# Patient Record
Sex: FEMALE | Race: AMERICAN INDIAN OR ALASKA NATIVE | NOT HISPANIC OR LATINO | Employment: OTHER | URBAN - METROPOLITAN AREA
[De-identification: names, ages, dates, MRNs, and addresses within clinical notes are randomized per-mention and may not be internally consistent; named-entity substitution may affect disease eponyms.]

---

## 2017-04-03 ENCOUNTER — TELEPHONE (OUTPATIENT)
Dept: RADIOLOGY | Facility: MEDICAL CENTER | Age: 72
End: 2017-04-03

## 2017-04-03 NOTE — TELEPHONE ENCOUNTER
ELFEGO DID NOT FIND ANY CURRENT PROBLEMS (EX; LUMP) WITH PT; PT REQUESTED DIAGNOSTIC MAMMOGRAM W/US; ADVISED I WILL CALL PT TO DISCUSS IF SHE WANTS DIAG OR SCREENING W/SONOCINE/TML

## 2017-05-15 ENCOUNTER — HOSPITAL ENCOUNTER (OUTPATIENT)
Dept: RADIOLOGY | Facility: MEDICAL CENTER | Age: 72
End: 2017-05-15
Attending: FAMILY MEDICINE
Payer: MEDICARE

## 2017-05-15 DIAGNOSIS — Z12.31 VISIT FOR SCREENING MAMMOGRAM: ICD-10-CM

## 2017-05-15 PROCEDURE — 77063 BREAST TOMOSYNTHESIS BI: CPT

## 2017-07-21 ENCOUNTER — HOSPITAL ENCOUNTER (OUTPATIENT)
Dept: RADIOLOGY | Facility: MEDICAL CENTER | Age: 72
End: 2017-07-21
Attending: PHYSICIAN ASSISTANT
Payer: MEDICARE

## 2017-07-21 DIAGNOSIS — M25.552 LEFT HIP PAIN: ICD-10-CM

## 2017-07-21 DIAGNOSIS — M96.1 POSTLAMINECTOMY SYNDROME, UNSPECIFIED REGION: ICD-10-CM

## 2017-07-21 PROCEDURE — 72110 X-RAY EXAM L-2 SPINE 4/>VWS: CPT

## 2017-07-21 PROCEDURE — 73521 X-RAY EXAM HIPS BI 2 VIEWS: CPT

## 2018-05-23 ENCOUNTER — HOSPITAL ENCOUNTER (OUTPATIENT)
Dept: RADIOLOGY | Facility: MEDICAL CENTER | Age: 73
End: 2018-05-23
Attending: NURSE PRACTITIONER
Payer: MEDICARE

## 2018-05-23 DIAGNOSIS — Z12.31 VISIT FOR SCREENING MAMMOGRAM: ICD-10-CM

## 2018-05-23 PROCEDURE — 77063 BREAST TOMOSYNTHESIS BI: CPT

## 2019-06-14 ENCOUNTER — HOSPITAL ENCOUNTER (OUTPATIENT)
Dept: RADIOLOGY | Facility: MEDICAL CENTER | Age: 74
End: 2019-06-14
Attending: FAMILY MEDICINE
Payer: MEDICARE

## 2019-06-14 DIAGNOSIS — Z12.31 VISIT FOR SCREENING MAMMOGRAM: ICD-10-CM

## 2019-06-14 PROCEDURE — 77063 BREAST TOMOSYNTHESIS BI: CPT

## 2019-06-27 ENCOUNTER — HOSPITAL ENCOUNTER (OUTPATIENT)
Facility: MEDICAL CENTER | Age: 74
End: 2019-06-27
Attending: FAMILY MEDICINE
Payer: MEDICARE

## 2019-06-27 ENCOUNTER — OFFICE VISIT (OUTPATIENT)
Dept: URGENT CARE | Facility: PHYSICIAN GROUP | Age: 74
End: 2019-06-27
Payer: MEDICARE

## 2019-06-27 VITALS
TEMPERATURE: 97.8 F | RESPIRATION RATE: 16 BRPM | BODY MASS INDEX: 31.8 KG/M2 | HEIGHT: 60 IN | OXYGEN SATURATION: 93 % | WEIGHT: 162 LBS | DIASTOLIC BLOOD PRESSURE: 78 MMHG | HEART RATE: 92 BPM | SYSTOLIC BLOOD PRESSURE: 112 MMHG

## 2019-06-27 DIAGNOSIS — Z94.4 LIVER TRANSPLANT RECIPIENT (HCC): ICD-10-CM

## 2019-06-27 DIAGNOSIS — R30.0 DYSURIA: ICD-10-CM

## 2019-06-27 LAB
APPEARANCE UR: NORMAL
BILIRUB UR STRIP-MCNC: NORMAL MG/DL
COLOR UR AUTO: NORMAL
GLUCOSE UR STRIP.AUTO-MCNC: 500 MG/DL
KETONES UR STRIP.AUTO-MCNC: NORMAL MG/DL
LEUKOCYTE ESTERASE UR QL STRIP.AUTO: NORMAL
NITRITE UR QL STRIP.AUTO: NORMAL
PH UR STRIP.AUTO: 6.5 [PH] (ref 5–8)
PROT UR QL STRIP: NORMAL MG/DL
RBC UR QL AUTO: NORMAL
SP GR UR STRIP.AUTO: 1.01
UROBILINOGEN UR STRIP-MCNC: NORMAL MG/DL

## 2019-06-27 PROCEDURE — 87077 CULTURE AEROBIC IDENTIFY: CPT

## 2019-06-27 PROCEDURE — 81002 URINALYSIS NONAUTO W/O SCOPE: CPT | Performed by: FAMILY MEDICINE

## 2019-06-27 PROCEDURE — 87086 URINE CULTURE/COLONY COUNT: CPT

## 2019-06-27 PROCEDURE — 99214 OFFICE O/P EST MOD 30 MIN: CPT | Performed by: FAMILY MEDICINE

## 2019-06-27 RX ORDER — CEFDINIR 300 MG/1
300 CAPSULE ORAL EVERY 12 HOURS
Qty: 10 CAP | Refills: 0 | Status: SHIPPED | OUTPATIENT
Start: 2019-06-27 | End: 2019-07-02

## 2019-06-27 RX ORDER — CLINDAMYCIN HYDROCHLORIDE 150 MG/1
150 CAPSULE ORAL 3 TIMES DAILY
COMMUNITY

## 2019-06-27 RX ORDER — RANITIDINE 150 MG/1
150 TABLET ORAL 2 TIMES DAILY
COMMUNITY

## 2019-06-27 RX ORDER — TRAMADOL HYDROCHLORIDE 50 MG/1
50 TABLET ORAL EVERY 4 HOURS PRN
COMMUNITY

## 2019-06-27 NOTE — PROGRESS NOTES
Subjective:      Cori Gary is a 73 y.o. female who presents with UTI      - This is a pleasant and non toxic appearing 73 y.o. female with c/o freq/burn x 1 day, no NVFC          ALLERGIES:  Codeine; Penicillins; [place] flavor; Mycophenolate; Naprosyn [naproxen]; Nickel; Pentazocine; Talwin; and Atenolol     PMH:  Past Medical History:   Diagnosis Date   • Anesthesia     slow to wake up   • Arthritis    • Asthma     as needed   • Bowel habit changes     constipation   • Breath shortness     with exertion    • Bronchitis    • Cancer (HCC) 2007    Liver CA   • Cataract     early stage   • Dental disorder     upper partial   • Diabetes (HCC)     on insulin    • Heart burn    • Hepatitis A     as a child    • Hyperlipidemia    • Hypertension    • Indigestion    • Liver transplant recipient (HCC) 2007    In [place]   • Pain     chronic back pain 5-6/10   • Pupil irregular of left eye     pupil does not constrict    • Respiration disorder     pt not sure type of disorder, only states she takes spiriva   • Urinary bladder disorder    • Urinary incontinence     wears a pad during the day        PSH:  Past Surgical History:   Procedure Laterality Date   • LUMBAR FUSION POSTERIOR  10/7/2016    Procedure: LUMBAR FUSION POSTERIOR - L3-4 EXTENSION;  Surgeon: Abdirahman Funez M.D.;  Location: SURGERY [facility];  Service:    • LUMBAR LAMINECTOMY DISKECTOMY  10/7/2016    Procedure: LUMBAR LAMINECTOMY DISKECTOMY;  Surgeon: Abdirahman Funez M.D.;  Location: [facility];  Service:    • LUMBAR FUSION O-ARM N/A 4/21/2016    Procedure: LUMBAR FUSION O-ARM TLIF WITH PEDICLE SCREWS L4, L5 AND S1;  Surgeon: Burak Cobunr M.D.;  Location: [facility];  Service:    • OTHER NEUROLOGICAL SURG  2011    lumbar laminectomy    • OTHER ABDOMINAL SURGERY  2008    LIVER TRANSPLANT   • BREAST BIOPSY  ?    left benign biopsy   • GYN SURGERY      Hysterectomy    • OTHER ORTHOPEDIC SURGERY      left shoulder  rotator cuff repair       MEDS:    Current Outpatient Prescriptions:   •  aspirin EC (ECOTRIN) 81 MG Tablet Delayed Response, Take 81 mg by mouth every day., Disp: , Rfl:   •  clindamycin (CLEOCIN) 150 MG Cap, Take 150 mg by mouth 3 times a day., Disp: , Rfl:   •  raNITidine (ZANTAC) 150 MG Tab, Take 150 mg by mouth 2 times a day., Disp: , Rfl:   •  tramadol (ULTRAM) 50 MG Tab, Take 50 mg by mouth every four hours as needed., Disp: , Rfl:   •  EMPAGLIFLOZIN PO, Take  by mouth., Disp: , Rfl:   •  cefdinir (OMNICEF) 300 MG Cap, Take 1 Cap by mouth every 12 hours for 5 days., Disp: 10 Cap, Rfl: 0  •  insulin glargine (LANTUS) 100 UNIT/ML Solution, Inject 15 Units as instructed every evening. Patient took 7 units on 10/06, Disp: , Rfl:   •  docusate sodium (COLACE) 100 MG Cap, Take 100 mg by mouth 2 times a day., Disp: , Rfl:   •  vitamin D, Ergocalciferol, (DRISDOL) 00757 UNITS Cap capsule, Take 50,000 Units by mouth Q30 DAYS., Disp: , Rfl:   •  insulin lispro (HUMALOG) 100 UNIT/ML Solution, Inject 0-8 Units as instructed 3 times a day before meals. As needed per sliding scale, Disp: , Rfl:   •  Multiple Vitamins-Minerals (MULTIVITAMIN ADULT) Tab, Take 1 Tab by mouth every day., Disp: , Rfl:   •  gabapentin (NEURONTIN) 300 MG Cap, Take 300 mg by mouth every bedtime., Disp: , Rfl:   •  tacrolimus (PROGRAF) 1 MG Cap, Take 1-2 mg by mouth 2 times a day. 2 mg in the AM, 1 mg in the PM   Indications: Body's Rejection of a Transplanted Organ, Disp: , Rfl:   •  atorvastatin (LIPITOR) 20 MG Tab, Take 10 mg by mouth every evening., Disp: , Rfl:   •  fluticasone (FLONASE) 50 MCG/ACT nasal spray, Spray 2 Sprays in nose every bedtime., Disp: , Rfl:   •  MAGNESIUM PO, Take 1 Tab by mouth every day., Disp: , Rfl:   •  solifenacin (VESICARE) 10 MG tablet, Take 5-10 mg by mouth every day. 10 mg every Sat, Sun, Tues, Thurs, 5 mg every Mon, Wed, Fri, Disp: , Rfl:   •  colesevelam (WELCHOL) 625 MG Tab, Take 625 mg by mouth every evening.  Indications: takes for kidneys, Disp: , Rfl:   •  Coenzyme Q10 (CO Q 10) 100 MG Cap, Take 100 mg by mouth every day., Disp: , Rfl:   •  linagliptin (TRADJENTA) 5 MG Tab tablet, Take 5 mg by mouth every day., Disp: , Rfl:   •  tiotropium (SPIRIVA) 18 MCG Cap, Inhale 18 mcg by mouth every day., Disp: , Rfl:   •  cetirizine (ZYRTEC) 10 MG Tab, Take 10 mg by mouth every day., Disp: , Rfl:   •  ketotifen (ZADITOR) 0.025 % ophthalmic solution, Place 1 Drop in both eyes 2 times a day. And again in the evening if needed, Disp: , Rfl:   •  losartan (COZAAR) 50 MG TABS, Take 50 mg by mouth 2 Times a Day., Disp: , Rfl:   •  pilocarpine (ISOPTO CARPINE) 1 % Solution, Place 1 Drop in left eye 2 Times a Day., Disp: , Rfl:     ** I have documented what I find to be significant in regards to past medical, social, family and surgical history  in my HPI or under PMH/PSH/FH review section, otherwise it is contributory **         HPI    Review of Systems   Genitourinary: Positive for dysuria and frequency.   All other systems reviewed and are negative.         Objective:     /78 (BP Location: Right arm, Patient Position: Sitting, BP Cuff Size: Adult)   Pulse 92   Temp 36.6 °C (97.8 °F) (Temporal)   Resp 16   Ht 1.524 m (5')   Wt 73.5 kg (162 lb)   SpO2 93%   BMI 31.64 kg/m²      Physical Exam   Constitutional: She appears well-developed. No distress.   HENT:   Head: Normocephalic and atraumatic.   Neck: Neck supple.   Cardiovascular: Regular rhythm.    No murmur heard.  Pulmonary/Chest: Effort normal. No respiratory distress.   Abdominal: Soft. There is no tenderness.   Neurological: She is alert. She exhibits normal muscle tone.   Skin: Skin is warm and dry.   Psychiatric: She has a normal mood and affect. Judgment normal.   Nursing note and vitals reviewed.              Assessment/Plan:         1. Dysuria  POCT Urinalysis [[id number]]    cefdinir (OMNICEF) 300 MG Cap    Urine Culture             Dx & d/c instructions  discussed w/ patient and/or family members.     Follow up with PCP (or here if PCP unavailable) in 2-3 days if symptoms not improving, ER if feeling/getting worse.    Any realistic and/or common medication side effects that may have been given today(i.e. Rash, GI upset/constipation, sedation, elevation of BP or blood sugars) reviewed.     Patient left in stable condition

## 2019-06-29 LAB
BACTERIA UR CULT: ABNORMAL
BACTERIA UR CULT: ABNORMAL
SIGNIFICANT IND 70042: ABNORMAL
SITE SITE: ABNORMAL
SOURCE SOURCE: ABNORMAL

## 2019-10-24 ENCOUNTER — APPOINTMENT (RX ONLY)
Dept: URBAN - METROPOLITAN AREA CLINIC 31 | Facility: CLINIC | Age: 74
Setting detail: DERMATOLOGY
End: 2019-10-24

## 2019-10-24 DIAGNOSIS — L30.0 NUMMULAR DERMATITIS: ICD-10-CM

## 2019-10-24 DIAGNOSIS — D22 MELANOCYTIC NEVI: ICD-10-CM

## 2019-10-24 DIAGNOSIS — L30.8 OTHER SPECIFIED DERMATITIS: ICD-10-CM

## 2019-10-24 DIAGNOSIS — L91.8 OTHER HYPERTROPHIC DISORDERS OF THE SKIN: ICD-10-CM

## 2019-10-24 DIAGNOSIS — L82.1 OTHER SEBORRHEIC KERATOSIS: ICD-10-CM

## 2019-10-24 DIAGNOSIS — L57.0 ACTINIC KERATOSIS: ICD-10-CM

## 2019-10-24 DIAGNOSIS — D18.0 HEMANGIOMA: ICD-10-CM

## 2019-10-24 PROBLEM — D22.62 MELANOCYTIC NEVI OF LEFT UPPER LIMB, INCLUDING SHOULDER: Status: ACTIVE | Noted: 2019-10-24

## 2019-10-24 PROBLEM — D18.01 HEMANGIOMA OF SKIN AND SUBCUTANEOUS TISSUE: Status: ACTIVE | Noted: 2019-10-24

## 2019-10-24 PROBLEM — D48.5 NEOPLASM OF UNCERTAIN BEHAVIOR OF SKIN: Status: ACTIVE | Noted: 2019-10-24

## 2019-10-24 PROBLEM — D22.5 MELANOCYTIC NEVI OF TRUNK: Status: ACTIVE | Noted: 2019-10-24

## 2019-10-24 PROBLEM — E78.5 HYPERLIPIDEMIA, UNSPECIFIED: Status: ACTIVE | Noted: 2019-10-24

## 2019-10-24 PROBLEM — I10 ESSENTIAL (PRIMARY) HYPERTENSION: Status: ACTIVE | Noted: 2019-10-24

## 2019-10-24 PROBLEM — J44.9 CHRONIC OBSTRUCTIVE PULMONARY DISEASE, UNSPECIFIED: Status: ACTIVE | Noted: 2019-10-24

## 2019-10-24 PROBLEM — D22.61 MELANOCYTIC NEVI OF RIGHT UPPER LIMB, INCLUDING SHOULDER: Status: ACTIVE | Noted: 2019-10-24

## 2019-10-24 PROBLEM — E13.9 OTHER SPECIFIED DIABETES MELLITUS WITHOUT COMPLICATIONS: Status: ACTIVE | Noted: 2019-10-24

## 2019-10-24 PROCEDURE — 11102 TANGNTL BX SKIN SINGLE LES: CPT

## 2019-10-24 PROCEDURE — ? LIQUID NITROGEN

## 2019-10-24 PROCEDURE — ? BIOPSY BY SHAVE METHOD

## 2019-10-24 PROCEDURE — 17000 DESTRUCT PREMALG LESION: CPT | Mod: 59

## 2019-10-24 PROCEDURE — ? COUNSELING

## 2019-10-24 PROCEDURE — 99203 OFFICE O/P NEW LOW 30 MIN: CPT | Mod: 25

## 2019-10-24 PROCEDURE — ? PRESCRIPTION

## 2019-10-24 RX ORDER — TRIAMCINOLONE ACETONIDE 1 MG/G
OINTMENT TOPICAL
Qty: 1 | Refills: 3 | Status: ERX | COMMUNITY
Start: 2019-10-24

## 2019-10-24 RX ADMIN — TRIAMCINOLONE ACETONIDE: 1 OINTMENT TOPICAL at 18:01

## 2019-10-24 ASSESSMENT — LOCATION DETAILED DESCRIPTION DERM
LOCATION DETAILED: LEFT LATERAL ABDOMEN
LOCATION DETAILED: SUPERIOR LUMBAR SPINE
LOCATION DETAILED: RIGHT RADIAL DORSAL HAND
LOCATION DETAILED: RIGHT ANTERIOR SHOULDER
LOCATION DETAILED: RIGHT INFERIOR LATERAL NECK
LOCATION DETAILED: RIGHT LATERAL ABDOMEN
LOCATION DETAILED: LEFT INFRAMAMMARY CREASE (INNER QUADRANT)
LOCATION DETAILED: RIGHT PROXIMAL MEDIAL POSTERIOR UPPER ARM
LOCATION DETAILED: LEFT ANTERIOR SHOULDER
LOCATION DETAILED: SUPERIOR THORACIC SPINE
LOCATION DETAILED: LEFT POSTERIOR SHOULDER
LOCATION DETAILED: LEFT MEDIAL BREAST 8-9:00 REGION
LOCATION DETAILED: RIGHT ELBOW
LOCATION DETAILED: LEFT DISTAL POSTERIOR UPPER ARM
LOCATION DETAILED: RIGHT CENTRAL TEMPLE
LOCATION DETAILED: LEFT LATERAL SUPERIOR CHEST
LOCATION DETAILED: LEFT RIB CAGE
LOCATION DETAILED: SUBXIPHOID
LOCATION DETAILED: RIGHT INFERIOR ANTERIOR NECK
LOCATION DETAILED: LEFT CLAVICULAR SKIN
LOCATION DETAILED: LEFT AXILLARY TAIL OF BREAST
LOCATION DETAILED: EPIGASTRIC SKIN
LOCATION DETAILED: RIGHT INFERIOR MEDIAL UPPER BACK
LOCATION DETAILED: RIGHT LATERAL SUPERIOR CHEST
LOCATION DETAILED: RIGHT RIB CAGE
LOCATION DETAILED: LEFT INFERIOR LATERAL NECK
LOCATION DETAILED: LEFT ULNAR DORSAL HAND

## 2019-10-24 ASSESSMENT — LOCATION SIMPLE DESCRIPTION DERM
LOCATION SIMPLE: LOWER BACK
LOCATION SIMPLE: LEFT HAND
LOCATION SIMPLE: LEFT BREAST
LOCATION SIMPLE: CHEST
LOCATION SIMPLE: LEFT POSTERIOR UPPER ARM
LOCATION SIMPLE: LEFT ANTERIOR NECK
LOCATION SIMPLE: RIGHT SHOULDER
LOCATION SIMPLE: RIGHT ELBOW
LOCATION SIMPLE: UPPER BACK
LOCATION SIMPLE: RIGHT UPPER BACK
LOCATION SIMPLE: ABDOMEN
LOCATION SIMPLE: RIGHT POSTERIOR UPPER ARM
LOCATION SIMPLE: RIGHT ANTERIOR NECK
LOCATION SIMPLE: LEFT SHOULDER
LOCATION SIMPLE: RIGHT HAND
LOCATION SIMPLE: RIGHT TEMPLE
LOCATION SIMPLE: LEFT CLAVICULAR SKIN

## 2019-10-24 ASSESSMENT — LOCATION ZONE DERM
LOCATION ZONE: HAND
LOCATION ZONE: FACE
LOCATION ZONE: NECK
LOCATION ZONE: ARM
LOCATION ZONE: TRUNK

## 2019-10-24 NOTE — PROCEDURE: BIOPSY BY SHAVE METHOD
Billing Type: Third-Party Bill
Lab: [id number]
Detail Level: Detailed
Was A Bandage Applied: Yes
Render In Bullet Format When Appropriate: No
Curettage Text: The wound bed was treated with curettage after the biopsy was performed.
Wound Care: Petrolatum
Anesthesia Type: 1% lidocaine with epinephrine
Additional Anesthesia Volume In Cc (Will Not Render If 0): 0
Cryotherapy Text: The wound bed was treated with cryotherapy after the biopsy was performed.
Lab Facility: 
Post-Care Instructions: I reviewed with the patient in detail post-care instructions. Patient is to keep the biopsy site bandaged overnight, and then wash once daily with soap and water and then apply a thin layer of petrolatum once daily until healed.
Depth Of Biopsy: dermis
Type Of Destruction Used: Curettage
Electrodesiccation And Curettage Text: The wound bed was treated with electrodesiccation and curettage after the biopsy was performed.
Consent: Written consent was obtained and risks were reviewed including but not limited to scarring, infection, bleeding, scabbing, incomplete removal, nerve damage and allergy to anesthesia.
Electrodesiccation Text: The wound bed was treated with electrodesiccation after the biopsy was performed.
Notification Instructions: Patient will be notified of biopsy results. However, patient instructed to call the office if not contacted within 2 weeks.
Size Of Lesion In Cm: 1.5
Dressing: bandage
Hemostasis: Aluminum Chloride and Electrocautery
Silver Nitrate Text: The wound bed was treated with silver nitrate after the biopsy was performed.
Biopsy Type: H and E

## 2019-10-24 NOTE — PROCEDURE: LIQUID NITROGEN
Consent: The patient's consent was obtained including but not limited to risks of crusting, scabbing, blistering, scarring, darker or lighter pigmentary change, recurrence, incomplete removal and infection.
Render Post-Care Instructions In Note?: no
Detail Level: Detailed
Duration Of Freeze Thaw-Cycle (Seconds): 15
Post-Care Instructions: I reviewed with the patient in detail post-care instructions. Patient is to wear sunprotection, and avoid picking at any of the treated lesions. Pt may apply Vaseline to crusted or scabbing areas.
Number Of Freeze-Thaw Cycles: 1 freeze-thaw cycle

## 2019-11-18 ENCOUNTER — APPOINTMENT (RX ONLY)
Dept: URBAN - METROPOLITAN AREA CLINIC 31 | Facility: CLINIC | Age: 74
Setting detail: DERMATOLOGY
End: 2019-11-18

## 2019-11-18 PROBLEM — D04.61 CARCINOMA IN SITU OF SKIN OF RIGHT UPPER LIMB, INCLUDING SHOULDER: Status: ACTIVE | Noted: 2019-11-18

## 2019-11-18 PROCEDURE — ? CURETTAGE AND DESTRUCTION

## 2019-11-18 PROCEDURE — 17263 DSTRJ MAL LES T/A/L 2.1-3.0: CPT

## 2019-11-18 NOTE — PROCEDURE: CURETTAGE AND DESTRUCTION
Cautery Type: electrodesiccation
Consent was obtained from the patient. The risks, benefits and alternatives to therapy were discussed in detail. Specifically, the risks of infection, scarring, bleeding, prolonged wound healing, nerve injury, incomplete removal, allergy to anesthesia and recurrence were addressed. Alternatives to ED&C, such as surgical removal and topical immunotherapy were also discussed.  Prior to the procedure, the treatment site was clearly identified and confirmed by the patient. All components of Universal Protocol/PAUSE Rule completed.
Additional Information: (Optional): The wound was cleaned, and a pressure dressing was applied.  The patient received detailed post-op instructions.
Hide Accession Number?: No
Size Of Lesion In Cm: 1.5
Detail Level: Detailed
Total Volume (Ccs): 1
Number Of Curettages: 2
Anesthesia Type: 1% lidocaine with epinephrine
Bill As A Line Item Or As Units: Line Item
What Was Performed First?: Curettage
Size Of Lesion After Curettage: 2.3
Post-Care Instructions: I reviewed with the patient in detail post-care instructions. Patient is to keep the area dry for 48 hours, and not to engage in any swimming until the area is healed. Should the patient develop any fevers, chills, bleeding, severe pain patient will contact the office immediately.

## 2021-05-12 ENCOUNTER — HOSPITAL ENCOUNTER (OUTPATIENT)
Dept: RADIOLOGY | Facility: MEDICAL CENTER | Age: 76
End: 2021-05-12
Attending: PHYSICIAN ASSISTANT
Payer: MEDICARE

## 2021-05-12 DIAGNOSIS — Z12.31 VISIT FOR SCREENING MAMMOGRAM: ICD-10-CM

## 2021-05-12 PROCEDURE — 77063 BREAST TOMOSYNTHESIS BI: CPT

## 2025-01-16 ENCOUNTER — TELEPHONE (OUTPATIENT)
Dept: CARDIOLOGY | Facility: MEDICAL CENTER | Age: 80
End: 2025-01-16
Payer: MEDICARE

## 2025-01-16 NOTE — TELEPHONE ENCOUNTER
Spoke to patient's daughter,Natividad, in regards to records for NP appointment with [facility].     Patient has seen a cardiologist before?  Yes   If yes, where?: Unknown     Any recent cardiac testing outside of [facility]?  No     Were any records requested?  No

## 2025-01-24 ENCOUNTER — TELEPHONE (OUTPATIENT)
Dept: CARDIOLOGY | Facility: MEDICAL CENTER | Age: 80
End: 2025-01-24

## 2025-01-24 ENCOUNTER — OFFICE VISIT (OUTPATIENT)
Dept: CARDIOLOGY | Facility: PHYSICIAN GROUP | Age: 80
End: 2025-01-24
Payer: MEDICARE

## 2025-01-24 VITALS
WEIGHT: 164.02 LBS | SYSTOLIC BLOOD PRESSURE: 126 MMHG | OXYGEN SATURATION: 93 % | HEIGHT: 60 IN | RESPIRATION RATE: 16 BRPM | BODY MASS INDEX: 32.2 KG/M2 | HEART RATE: 80 BPM | DIASTOLIC BLOOD PRESSURE: 74 MMHG

## 2025-01-24 DIAGNOSIS — Z94.4 LIVER TRANSPLANT RECIPIENT (HCC): ICD-10-CM

## 2025-01-24 DIAGNOSIS — E11.9 TYPE 2 DIABETES MELLITUS WITHOUT COMPLICATION, WITHOUT LONG-TERM CURRENT USE OF INSULIN (HCC): ICD-10-CM

## 2025-01-24 DIAGNOSIS — E78.5 HYPERLIPIDEMIA, UNSPECIFIED HYPERLIPIDEMIA TYPE: ICD-10-CM

## 2025-01-24 DIAGNOSIS — R06.02 SHORTNESS OF BREATH: ICD-10-CM

## 2025-01-24 DIAGNOSIS — I10 ESSENTIAL HYPERTENSION, BENIGN: ICD-10-CM

## 2025-01-24 DIAGNOSIS — Z87.891 FORMER SMOKER: ICD-10-CM

## 2025-01-24 DIAGNOSIS — Z01.810 PRE-OPERATIVE CARDIOVASCULAR EXAMINATION: ICD-10-CM

## 2025-01-24 PROCEDURE — 99204 OFFICE O/P NEW MOD 45 MIN: CPT | Performed by: NURSE PRACTITIONER

## 2025-01-24 PROCEDURE — 3074F SYST BP LT 130 MM HG: CPT | Performed by: NURSE PRACTITIONER

## 2025-01-24 PROCEDURE — 3078F DIAST BP <80 MM HG: CPT | Performed by: NURSE PRACTITIONER

## 2025-01-24 RX ORDER — METOPROLOL SUCCINATE 25 MG/1
25 TABLET, EXTENDED RELEASE ORAL EVERY EVENING
COMMUNITY
Start: 2025-01-24

## 2025-01-24 ASSESSMENT — ENCOUNTER SYMPTOMS
CLAUDICATION: 0
SHORTNESS OF BREATH: 1
PND: 0
DIZZINESS: 0
ABDOMINAL PAIN: 0
ORTHOPNEA: 0
FEVER: 0
MYALGIAS: 0
PALPITATIONS: 0
COUGH: 0

## 2025-01-24 NOTE — PROGRESS NOTES
Chief Complaint   Patient presents with    Hyperlipidemia    Preoperative CV Eval     Subjective     Cori Gary is a 79 y.o. female who presents today for cardiac clearance for upcoming surgery.    Hx of DM type II (insulin dependent), HTN, HLD, liver transplant recipient, chronic back pain, and allergies.    She is here today alone. Lives in [place], [place]. She has chronic leg swelling and shortness of breath that is chronic for the last few years but progressing a little. Some tachycardia at times but not irregular.    Former 30 pack year smoker, no ETOH/drug use, daily caffeine.    Pending R hip surgery, needs clearance. Pending transplant clearance from GI.    She has no chest pain or dizziness/lightheadedness.    Past Medical History:   Diagnosis Date    Anesthesia     slow to wake up    Arthritis     Asthma     as needed    Bowel habit changes     constipation    Breath shortness     with exertion     Bronchitis     Cancer (HCC) 2007    Liver CA    Cataract     early stage    Dental disorder     upper partial    Diabetes (HCC)     on insulin     Heart burn     Hepatitis A     as a child     Hyperlipidemia     Hypertension     Indigestion     Liver transplant recipient (HCC) 2007    In [place]    Pain     chronic back pain 5-6/10    Pupil irregular of left eye     pupil does not constrict     Respiration disorder     pt not sure type of disorder, only states she takes spiriva    Urinary bladder disorder     Urinary incontinence     wears a pad during the day     Past Surgical History:   Procedure Laterality Date    FUSION, SPINE, LUMBAR, PLIF  10/7/2016    Procedure: LUMBAR FUSION POSTERIOR - L3-4 EXTENSION;  Surgeon: Abdirahman Funez M.D.;  Location: SURGERY [facility];  Service:     LUMBAR LAMINECTOMY DISKECTOMY  10/7/2016    Procedure: LUMBAR LAMINECTOMY DISKECTOMY;  Surgeon: Abdirahman Funez M.D.;  Location: SURGERY [facility];  Service:     LUMBAR FUSION O-ARM N/A 4/21/2016    Procedure:  "LUMBAR FUSION O-ARM TLIF WITH PEDICLE SCREWS L4, L5 AND S1;  Surgeon: Burak Coburn M.D.;  Location: SURGERY [facility];  Service:     OTHER NEUROLOGICAL SURG  2011    lumbar laminectomy     OTHER ABDOMINAL SURGERY      LIVER TRANSPLANT    BREAST BIOPSY  ?    left benign biopsy    GYN SURGERY      Hysterectomy     OTHER ORTHOPEDIC SURGERY      left shoulder rotator cuff repair     History reviewed. No pertinent family history.  Social History     Socioeconomic History    Marital status:      Spouse name: Not on file    Number of children: Not on file    Years of education: Not on file    Highest education level: Not on file   Occupational History    Not on file   Tobacco Use    Smoking status: Former     Current packs/day: 0.00     Average packs/day: 0.3 packs/day for 30.0 years (7.5 ttl pk-yrs)     Types: Cigarettes     Start date: 1965     Quit date: 1995     Years since quittin.0    Smokeless tobacco: Never   Substance and Sexual Activity    Alcohol use: No    Drug use: No    Sexual activity: Not on file   Other Topics Concern    Not on file   Social History Narrative    Not on file     Social Drivers of Health     Financial Resource Strain: Not on file   Food Insecurity: Not on file   Transportation Needs: Not on file   Physical Activity: Not on file   Stress: Not on file   Social Connections: Not on file   Intimate Partner Violence: Not on file   Housing Stability: Not on file     Allergies   Allergen Reactions    Codeine Rash     Pt. Reports rash while taking codeine    Penicillins Rash     Pt reports rash when taking penicillins    [place] Flavor Shortness of Breath and Itching    Mycophenolate     Naprosyn [Naproxen] Unspecified     Pt. Reports blood in stool after taking naprosyn.    Nickel Itching and Swelling    Pentazocine      Another name for Talwin    Talwin Unspecified     Pt reports feeling \"strange sensation\" while taking talwin.    Atenolol Cough     Outpatient " Encounter Medications as of 1/24/2025   Medication Sig Dispense Refill    aspirin EC (ECOTRIN) 81 MG Tablet Delayed Response Take 81 mg by mouth every day.      clindamycin (CLEOCIN) 150 MG Cap Take 150 mg by mouth 3 times a day.      raNITidine (ZANTAC) 150 MG Tab Take 150 mg by mouth 2 times a day.      tramadol (ULTRAM) 50 MG Tab Take 50 mg by mouth every four hours as needed.      EMPAGLIFLOZIN PO Take  by mouth.      insulin glargine (LANTUS) 100 UNIT/ML Solution Inject 15 Units as instructed every evening. Patient took 7 units on 10/06      docusate sodium (COLACE) 100 MG Cap Take 100 mg by mouth 2 times a day.      vitamin D, Ergocalciferol, (DRISDOL) 14910 UNITS Cap capsule Take 50,000 Units by mouth Q30 DAYS.      pilocarpine (ISOPTO CARPINE) 1 % Solution Place 1 Drop in left eye 2 Times a Day.      insulin lispro (HUMALOG) 100 UNIT/ML Solution Inject 0-8 Units as instructed 3 times a day before meals. As needed per sliding scale      Multiple Vitamins-Minerals (MULTIVITAMIN ADULT) Tab Take 1 Tab by mouth every day.      gabapentin (NEURONTIN) 300 MG Cap Take 300 mg by mouth every bedtime.      tacrolimus (PROGRAF) 1 MG Cap Take 1-2 mg by mouth 2 times a day. 2 mg in the AM, 1 mg in the PM   Indications: Body's Rejection of a Transplanted Organ      atorvastatin (LIPITOR) 20 MG Tab Take 10 mg by mouth every evening.      fluticasone (FLONASE) 50 MCG/ACT nasal spray Spray 2 Sprays in nose every bedtime.      MAGNESIUM PO Take 1 Tab by mouth every day.      solifenacin (VESICARE) 10 MG tablet Take 5-10 mg by mouth every day. 10 mg every Sat, Sun, Tues, Thurs, 5 mg every Mon, Wed, Fri      colesevelam (WELCHOL) 625 MG Tab Take 625 mg by mouth every evening. Indications: takes for kidneys      Coenzyme Q10 (CO Q 10) 100 MG Cap Take 100 mg by mouth every day.      linagliptin (TRADJENTA) 5 MG Tab tablet Take 5 mg by mouth every day.      tiotropium (SPIRIVA) 18 MCG Cap Inhale 18 mcg by mouth every day.       cetirizine (ZYRTEC) 10 MG Tab Take 10 mg by mouth every day.      ketotifen (ZADITOR) 0.025 % ophthalmic solution Place 1 Drop in both eyes 2 times a day. And again in the evening if needed      losartan (COZAAR) 50 MG TABS Take 50 mg by mouth 2 Times a Day.       No facility-administered encounter medications on file as of 1/24/2025.     Review of Systems   Constitutional:  Positive for malaise/fatigue. Negative for fever.   Respiratory:  Positive for shortness of breath. Negative for cough.    Cardiovascular:  Positive for leg swelling. Negative for chest pain, palpitations, orthopnea, claudication and PND.   Gastrointestinal:  Negative for abdominal pain.   Musculoskeletal:  Negative for myalgias.   Neurological:  Negative for dizziness.              Objective     BP (!) 0/0 (BP Location: Left arm, Patient Position: Sitting, BP Cuff Size: Adult)   Ht 1.524 m (5')   Wt 74.4 kg (164 lb 0.4 oz)   BMI 32.03 kg/m²     Physical Exam  Vitals and nursing note reviewed.   Constitutional:       Appearance: Normal appearance. She is well-developed. She is obese.   HENT:      Head: Normocephalic and atraumatic.   Neck:      Vascular: No JVD.   Cardiovascular:      Rate and Rhythm: Normal rate and regular rhythm.      Pulses: Normal pulses.      Heart sounds: Normal heart sounds.   Pulmonary:      Effort: Pulmonary effort is normal.      Breath sounds: Normal breath sounds.   Musculoskeletal:         General: Normal range of motion.      Right lower leg: Edema present.      Left lower leg: Edema present.      Comments: Mild leg swelling   Skin:     General: Skin is warm and dry.      Capillary Refill: Capillary refill takes less than 2 seconds.   Neurological:      General: No focal deficit present.      Mental Status: She is alert and oriented to person, place, and time. Mental status is at baseline.   Psychiatric:         Mood and Affect: Mood normal.         Behavior: Behavior normal.         Thought Content: Thought  content normal.         Judgment: Judgment normal.                Assessment & Plan     1. Essential hypertension, benign        2. Hyperlipidemia, unspecified hyperlipidemia type        3. Liver transplant recipient (HCC)        4. Type 2 diabetes mellitus without complication, without long-term current use of insulin (HCC)        5. Pre-operative cardiovascular examination  EKG        Medical Decision Making: Today's Assessment/Status/Plan:      1. HTN  -good control through losartan 50 mg QAM and toprol 25 mg QPM  -current meds through PCP  -BP goal <130/80    2. HLD with DM type II and obesity  -cont asa, statin  -diabetic management through PCP  -work on weight loss with diet and exercise    3. Liver transplant recipient  -follow up with liver transplant team or GI for clearance    4. Shortness of breath, former smoker  -doesn't sound like anginal component  -prior cath in '08 for liver transplant  -will work up lungs with PFT and CT chest, if abnormal-refer to pulmonary  -if normal-consider repeat cardiac testing such as echo and stress test  -mild edema in legs, chronic and doesn't want water pill, unless gets worse    Patient is to follow up with Jackie DHILLON in 2 months with PFT and CT chest.

## 2025-01-24 NOTE — PATIENT INSTRUCTIONS
Follow up with lung testing (CT-chest and PFT).    If breathing or leg swelling increases, we can always add an as needed water pill.    Recommend follow up in 2 months after testing to consider heart testing if warranted.    SWITCH losartan to AM and move metoprolol to PM. Check dosing on metoprolol to see if this is supposed to be once daily extended release.

## 2025-01-25 NOTE — TELEPHONE ENCOUNTER
SC        Caller: KANDACE WITH [facility]    Topic/issue: Their office was calling because the needed the orders for the patient's PFT sent to their office as well as the CT of her chest as well so that the testing will be authorized as well. Please advise    Callback Number: [phone]    FAX# [phone](attn Kandace)      Thank you    -Ari PARISI

## 2025-01-29 NOTE — TELEPHONE ENCOUNTER
PFT and CT orders sent to new fax number provided [phone]. Called Kandace at [facility] updating that orders were resent and confirmation received and to call back if there were any issues.

## 2025-01-29 NOTE — TELEPHONE ENCOUNTER
S/w Kandace at [facility] to confirm faxes were received. PFT and CT orders received by Kandace. Obtained fax number for [facility] to fax CT scan order per Kandace's request.

## 2025-01-29 NOTE — TELEPHONE ENCOUNTER
SC    Caller: Kandace from [facility]    Topic/issue: Kandace has realized that the fax number that was provided the other day goes to a fax machine that is currently not working. Requesting we refax these orders to the number listed below. Requesting we do it STAT if possible - their team has a meeting today to go over all the orders for patients and they would love to get this order in as soon as possible so patient can be scheduled.     Fax Number: [phone]    Callback Number: [phone]    Thank you,  Silvia PARISI

## 2025-01-29 NOTE — TELEPHONE ENCOUNTER
SC    Caller: Kandace at [facility]     Topic/issue: Pt has a CT Scan order and they would like it sent to [facility] due to pt's location.  Caller is requesting a callback to confirm order was sent to [place]     Callback Number: [phone]    Thank You   Neha MTZ

## 2025-02-06 ENCOUNTER — TELEPHONE (OUTPATIENT)
Dept: CARDIOLOGY | Facility: MEDICAL CENTER | Age: 80
End: 2025-02-06
Payer: MEDICARE

## 2025-02-07 NOTE — TELEPHONE ENCOUNTER
Phone number called: [phone]     Call outcome: Voicemail reached. Message left with number provided to return call.

## 2025-02-07 NOTE — TELEPHONE ENCOUNTER
SC    Caller: Kandace    Topic/issue: [facility] [place] is looking for clarification on the   losartan (COZAAR) 50 MG TABS    They have records of it being lowered to 25 MG based off nephrology.    Please advise    Callback Number: [phone]    Thank you,  Qing HATCH

## 2025-04-29 ENCOUNTER — NON-PROVIDER VISIT (OUTPATIENT)
Dept: SLEEP MEDICINE | Facility: MEDICAL CENTER | Age: 80
End: 2025-04-29
Attending: NURSE PRACTITIONER
Payer: MEDICARE

## 2025-04-29 VITALS — BODY MASS INDEX: 32.39 KG/M2 | HEIGHT: 60 IN | WEIGHT: 165 LBS

## 2025-04-29 DIAGNOSIS — Z87.891 FORMER SMOKER: ICD-10-CM

## 2025-04-29 DIAGNOSIS — R06.02 SHORTNESS OF BREATH: ICD-10-CM

## 2025-04-29 PROCEDURE — 94060 EVALUATION OF WHEEZING: CPT | Performed by: INTERNAL MEDICINE

## 2025-04-29 PROCEDURE — 94729 DIFFUSING CAPACITY: CPT | Performed by: INTERNAL MEDICINE

## 2025-04-29 PROCEDURE — 94726 PLETHYSMOGRAPHY LUNG VOLUMES: CPT | Performed by: INTERNAL MEDICINE

## 2025-04-29 NOTE — PROCEDURES
Technician: Fawn Leon RRT, CPFT  Good patient effort & cooperation.  The results of this test meet the ATS/ERS standards for acceptability & reproducibility.  Test was performed on the Swivel Body Plethysmograph-Elite DX system.  Predicted equations for Spirometry are GLI-2012, ITS for lung volumes, and GLI-2017 for DLCO.  The DLCO was uncorrected for Hgb.  A bronchodilator of Ventolin HFA -2puffs via spacer administered.  DLCO performed during dilation period. IVC is less than 90%.    Interpretation:  FVC 1.67 L, Z-score -1.12  FEV1 1.12 L, Z-score -1.64  FEV1/FVC 67%, Z-score -1.15  TLC 5.04 L, Z-score 1.23  RV 3.02 L, Z-score 2.15  DLCO 15.74 mL/min/mmHg, Z-score -0.15  Flow-volume loop: Concave expiratory limb    Spirometry demonstrates mild obstruction when considering shape of flow-volume loop.  There is a trend towards bronchodilator response in FEV1.  Lung volumes demonstrate mild air trapping.  Gas exchange is normal.    Conclusion: Mild obstruction with mild air trapping and preserved gas exchange can be seen with asthma.  Correlate clinically.

## 2025-05-01 ENCOUNTER — RESULTS FOLLOW-UP (OUTPATIENT)
Dept: CARDIOLOGY | Facility: MEDICAL CENTER | Age: 80
End: 2025-05-01
Payer: MEDICARE

## 2025-05-01 NOTE — TELEPHONE ENCOUNTER
Phone Number Called: [phone]     Call outcome: the patient's voicemail is full. Called the patient's daughter at [phone]    Message: Spoke with the patient's daughter Natividad. Informed Natividad of [facility] recommendations to follow up with her PCP on possible asthma management. Natividad asked about cardiac clearance for hip surgery. Unable to locate cardiac clearance form in chart. Requested that they call the surgeon's office and request a cardiac clearance form be sent to our office.

## 2025-05-01 NOTE — TELEPHONE ENCOUNTER
----- Message from Nurse Practitioner IVETT Ramos sent at 5/1/2025  9:27 AM PDT -----  Please call patient and let her know that her PFT was slightly abnormal and I recommend she see her PCP about management for possible asthma. [place]

## 2025-05-15 ENCOUNTER — OFFICE VISIT (OUTPATIENT)
Dept: CARDIOLOGY | Facility: PHYSICIAN GROUP | Age: 80
End: 2025-05-15
Payer: MEDICARE

## 2025-05-15 VITALS
HEIGHT: 59 IN | HEART RATE: 88 BPM | DIASTOLIC BLOOD PRESSURE: 64 MMHG | OXYGEN SATURATION: 95 % | SYSTOLIC BLOOD PRESSURE: 114 MMHG | WEIGHT: 166.6 LBS | RESPIRATION RATE: 14 BRPM | BODY MASS INDEX: 33.59 KG/M2

## 2025-05-15 DIAGNOSIS — E11.9 TYPE 2 DIABETES MELLITUS WITHOUT COMPLICATION, WITHOUT LONG-TERM CURRENT USE OF INSULIN (HCC): ICD-10-CM

## 2025-05-15 DIAGNOSIS — R06.09 DYSPNEA ON EXERTION: ICD-10-CM

## 2025-05-15 DIAGNOSIS — J45.20 MILD INTERMITTENT ASTHMA WITHOUT COMPLICATION: ICD-10-CM

## 2025-05-15 DIAGNOSIS — I10 ESSENTIAL HYPERTENSION, BENIGN: ICD-10-CM

## 2025-05-15 DIAGNOSIS — E78.5 HYPERLIPIDEMIA, UNSPECIFIED HYPERLIPIDEMIA TYPE: ICD-10-CM

## 2025-05-15 DIAGNOSIS — Z94.4 LIVER TRANSPLANT RECIPIENT (HCC): ICD-10-CM

## 2025-05-15 DIAGNOSIS — N18.32 STAGE 3B CHRONIC KIDNEY DISEASE: ICD-10-CM

## 2025-05-15 PROBLEM — N18.9 CKD (CHRONIC KIDNEY DISEASE): Status: ACTIVE | Noted: 2025-05-15

## 2025-05-15 PROCEDURE — 3078F DIAST BP <80 MM HG: CPT | Performed by: NURSE PRACTITIONER

## 2025-05-15 PROCEDURE — 99214 OFFICE O/P EST MOD 30 MIN: CPT | Performed by: NURSE PRACTITIONER

## 2025-05-15 PROCEDURE — 3074F SYST BP LT 130 MM HG: CPT | Performed by: NURSE PRACTITIONER

## 2025-05-15 RX ORDER — FAMOTIDINE 10 MG
10 TABLET ORAL DAILY
COMMUNITY

## 2025-05-15 RX ORDER — ALBUTEROL SULFATE 5 MG/ML
2.5 SOLUTION RESPIRATORY (INHALATION) EVERY 4 HOURS PRN
COMMUNITY

## 2025-05-15 ASSESSMENT — ENCOUNTER SYMPTOMS
CHILLS: 0
PND: 0
PALPITATIONS: 0
BRUISES/BLEEDS EASILY: 0
FEVER: 0
ABDOMINAL PAIN: 0
MYALGIAS: 0
DIZZINESS: 0
COUGH: 0
SHORTNESS OF BREATH: 1
NAUSEA: 0
INSOMNIA: 0
LOSS OF CONSCIOUSNESS: 0
HEADACHES: 0
ORTHOPNEA: 0

## 2025-05-15 NOTE — PROGRESS NOTES
Chief Complaint   Patient presents with    Follow-Up    Hip Pain    Shortness of Breath    HTN (Controlled)    Hyperlipidemia    Diabetes (Controlled)       Subjective     Cori Gary is a 79 y.o. female who presents today for cardiac pre-operative clearance for right hip surgery.    Cori is an 79 year old female with history of liver cancer, status post liver transplant in  ([place], [place]), HTN, hyperlipidemia, DM and CKD, last seen by ALEJO Ramos in 2025.    She was having some mild shortness of breath, and PFTs indicated some mild asthma; she does use inhalers for this.    She is here today for cardiac clearance for right hip surgery with Dr. Alonso.    She does still have some mild shortness of breath, mostly with exertion; no chest pain, pressure, tightness or discomfort; no palpitations; some mild lightheadedness, but no syncope; chronic, mild LE edema.    Past Medical History[1]  Past Surgical History[2]  History reviewed. No pertinent family history.  Social History     Socioeconomic History    Marital status:      Spouse name: Not on file    Number of children: Not on file    Years of education: Not on file    Highest education level: Not on file   Occupational History    Not on file   Tobacco Use    Smoking status: Former     Current packs/day: 0.00     Average packs/day: 0.3 packs/day for 30.0 years (7.5 ttl pk-yrs)     Types: Cigarettes     Start date: 1965     Quit date: 1995     Years since quittin.3    Smokeless tobacco: Never   Substance and Sexual Activity    Alcohol use: No    Drug use: No    Sexual activity: Not on file   Other Topics Concern    Not on file   Social History Narrative    Not on file     Social Drivers of Health     Financial Resource Strain: Not on file   Food Insecurity: Not on file   Transportation Needs: Not on file   Physical Activity: Not on file   Stress: Not on file   Social Connections: Not on file   Intimate Partner Violence:  "Not on file   Housing Stability: Not on file     Allergies[3]  Encounter Medications[4]  Review of Systems   Constitutional:  Positive for malaise/fatigue. Negative for chills and fever.   HENT:  Negative for congestion.    Respiratory:  Positive for shortness of breath. Negative for cough.    Cardiovascular:  Positive for leg swelling. Negative for chest pain, palpitations, orthopnea and PND.   Gastrointestinal:  Negative for abdominal pain and nausea.   Musculoskeletal:  Positive for joint pain. Negative for myalgias.   Skin:  Negative for rash.   Neurological:  Negative for dizziness, loss of consciousness and headaches.   Endo/Heme/Allergies:  Does not bruise/bleed easily.   Psychiatric/Behavioral:  The patient does not have insomnia.               Objective     /64 (BP Location: Left arm, Patient Position: Sitting, BP Cuff Size: Adult)   Pulse 88   Resp 14   Ht 1.511 m (4' 11.49\")   Wt 75.6 kg (166 lb 9.6 oz)   SpO2 95%   BMI 33.10 kg/m²     Physical Exam  Constitutional:       Appearance: She is well-developed.      Comments: BMI 33.10   HENT:      Head: Normocephalic.   Neck:      Vascular: No JVD.   Cardiovascular:      Rate and Rhythm: Normal rate and regular rhythm.      Heart sounds: Normal heart sounds.   Pulmonary:      Effort: Pulmonary effort is normal. No respiratory distress.      Breath sounds: Normal breath sounds. No wheezing or rales.   Abdominal:      General: Bowel sounds are normal. There is no distension.      Palpations: Abdomen is soft.      Tenderness: There is no abdominal tenderness.   Musculoskeletal:         General: Normal range of motion.      Cervical back: Normal range of motion and neck supple.      Right lower leg: Edema present.      Left lower leg: Edema present.      Comments: 1+ edema to the ankles bilaterally.   Skin:     General: Skin is warm and dry.      Findings: No rash.   Neurological:      Mental Status: She is alert and oriented to person, place, and " time.   Psychiatric:         Mood and Affect: Mood normal.         Behavior: Behavior normal.       STRESS TESTING:    IMPRESSIONS OF MPI OF 12/11/2015:  Normal stress test.   No evidence of significant jeopardized viable myocardium or prior myocardial infarction.  Normal left ventricular size, ejection fraction, and wall motion.     ECHOCARDIOGRAPHY:    CONCLUSIONS OF TTE OF 12/11/2015:  Normal echocardiogram  Normal LV size, LVEF 60%. Normal RA, LA and RV  Trace TR. RVSP 30mmHg.    LABS:    Labs as of 4/1/2025 ([facility]):  Glucose 146  BUN 29  Creatinine 1.31  GFR 41  Potassium 4.8  AST 14  ALT 31  WBC 5.8  RBC 5.14  Hgb 15.1  Hct 45.7  Platelets 302    Assessment & Plan     1. Dyspnea on exertion  EC-ECHOCARDIOGRAM COMPLETE W/O CONT      2. Mild intermittent asthma without complication        3. Essential hypertension, benign        4. Hyperlipidemia, unspecified hyperlipidemia type        5. Stage 3b chronic kidney disease        6. Liver transplant recipient (HCC)        7. Type 2 diabetes mellitus without complication, without long-term current use of insulin (HCC)            Medical Decision Making: Today's Assessment/Status/Plan:        Right hip pain, with surgery pending. Given ongoing shortness of breath, will obtain echocardiogram, and she is agreeable.  Mild asthma, treated with inhalers, stable.  Hypertension, treated with Losartan 50mg and Toprol XL 25mg once daily.  Hyperlipidemia, treated with Lipitor 20mg.  CKD, stage 3b, GFR 41 in April 2025. Followed by nephrology.  History of liver transplant in 2008, followed by GI in [place].  Diabetes mellitus, treated with combination therapy, followed by PCP.    As above, to obtain updated echocardiogram.  Same medications for now.  Keep follow-up with other providers.    Follow-up with me in 6 months.                       [1]   Past Medical History:  Diagnosis Date    Anesthesia     Slow to wake up    Arthritis     Asthma     Uses inhalers    Bowel habit  "changes     Constipation    Breath shortness     With exertion    Cataract     Early stage    Dental disorder     Upper partial    Diabetes (HCC)     on insulin     Heart burn     Hepatitis A     as a child     Hip pain     Hyperlipidemia     Hypertension     Liver cancer (HCC) 2008    Status post liver transplant    Liver transplant recipient (HCC) 2008    Done in [place], followed by GI at [place].    Pupil irregular of left eye     Pupil does not constrict    Urinary bladder disorder     Urinary incontinence     Wears a pad during the day   [2]   Past Surgical History:  Procedure Laterality Date    FUSION, SPINE, LUMBAR, PLIF  10/7/2016    Procedure: LUMBAR FUSION POSTERIOR - L3-4 EXTENSION;  Surgeon: Abdirahman Funez M.D.;  Location: SURGERY [facility];  Service:     LUMBAR LAMINECTOMY DISKECTOMY  10/7/2016    Procedure: LUMBAR LAMINECTOMY DISKECTOMY;  Surgeon: Abdirahman Funez M.D.;  Location: SURGERY [facility];  Service:     LUMBAR FUSION O-ARM N/A 4/21/2016    Procedure: LUMBAR FUSION O-ARM TLIF WITH PEDICLE SCREWS L4, L5 AND S1;  Surgeon: Burak Coburn M.D.;  Location: SURGERY [facility];  Service:     OTHER NEUROLOGICAL SURG  2011    lumbar laminectomy     OTHER ABDOMINAL SURGERY  2008    LIVER TRANSPLANT    BREAST BIOPSY  ?    left benign biopsy    GYN SURGERY      Hysterectomy     OTHER ORTHOPEDIC SURGERY      left shoulder rotator cuff repair   [3]   Allergies  Allergen Reactions    Codeine Rash     Pt. Reports rash while taking codeine    Penicillins Rash     Pt reports rash when taking penicillins    [place] Flavor Shortness of Breath and Itching    Mycophenolate     Naprosyn [Naproxen] Unspecified     Pt. Reports blood in stool after taking naprosyn.    Nickel Itching and Swelling    Pentazocine      Another name for Talwin    Talwin Unspecified     Pt reports feeling \"strange sensation\" while taking talwin.    Atenolol Cough   [4]   Outpatient Encounter Medications as of 5/15/2025 "   Medication Sig Dispense Refill    Multiple Vitamins-Minerals (MULTIVITAMIN ADULT, MINERALS,) Tab Take 1 Tablet by mouth every day.      famotidine (PEPCID) 10 MG tablet Take 10 mg by mouth every day.      albuterol (PROVENTIL) 2.5mg/0.5ml Nebu Soln Take 2.5 mg by nebulization every four hours as needed for Shortness of Breath.      metoprolol SR (TOPROL XL) 25 MG TABLET SR 24 HR Take 1 Tablet by mouth every evening.      aspirin EC (ECOTRIN) 81 MG Tablet Delayed Response Take 81 mg by mouth every day.      clindamycin (CLEOCIN) 150 MG Cap Take 150 mg by mouth 3 times a day.      tramadol (ULTRAM) 50 MG Tab Take 50 mg by mouth every four hours as needed.      EMPAGLIFLOZIN PO Take  by mouth.      insulin glargine (LANTUS) 100 UNIT/ML Solution Inject 15 Units as instructed every evening. Patient took 7 units on 10/06      docusate sodium (COLACE) 100 MG Cap Take 100 mg by mouth 2 times a day.      vitamin D, Ergocalciferol, (DRISDOL) 42066 UNITS Cap capsule Take 50,000 Units by mouth Q30 DAYS.      pilocarpine (ISOPTO CARPINE) 1 % Solution Place 1 Drop in left eye 2 Times a Day.      insulin lispro (HUMALOG) 100 UNIT/ML Solution Inject 0-8 Units as instructed 3 times a day before meals. As needed per sliding scale      gabapentin (NEURONTIN) 300 MG Cap Take 300 mg by mouth every bedtime.      tacrolimus (PROGRAF) 1 MG Cap Take 1-2 mg by mouth 2 times a day. 2 mg in the AM, 1 mg in the PM   Indications: Body's Rejection of a Transplanted Organ      atorvastatin (LIPITOR) 20 MG Tab Take 10 mg by mouth every evening.      fluticasone (FLONASE) 50 MCG/ACT nasal spray Spray 2 Sprays in nose every bedtime.      MAGNESIUM PO Take 1 Tab by mouth every day.      solifenacin (VESICARE) 10 MG tablet Take 5-10 mg by mouth every day. 10 mg every Sat, Sun, Tues, Thurs, 5 mg every Mon, Wed, Fri      colesevelam (WELCHOL) 625 MG Tab Take 625 mg by mouth every evening. Indications: takes for kidneys      Coenzyme Q10 (CO Q 10) 100  MG Cap Take 100 mg by mouth every day.      linagliptin (TRADJENTA) 5 MG Tab tablet Take 5 mg by mouth every day.      tiotropium (SPIRIVA) 18 MCG Cap Inhale 18 mcg by mouth every day.      cetirizine (ZYRTEC) 10 MG Tab Take 10 mg by mouth every day.      ketotifen (ZADITOR) 0.025 % ophthalmic solution Place 1 Drop in both eyes 2 times a day. And again in the evening if needed      losartan (COZAAR) 50 MG TABS Take 50 mg by mouth every day.      [DISCONTINUED] raNITidine (ZANTAC) 150 MG Tab Take 150 mg by mouth 2 times a day. (Patient not taking: Reported on 5/15/2025)      [DISCONTINUED] Multiple Vitamins-Minerals (MULTIVITAMIN ADULT) Tab Take 1 Tab by mouth every day.       No facility-administered encounter medications on file as of 5/15/2025.

## 2025-05-16 ENCOUNTER — TELEPHONE (OUTPATIENT)
Dept: SCHEDULING | Facility: IMAGING CENTER | Age: 80
End: 2025-05-16
Payer: MEDICARE

## 2025-05-16 NOTE — TELEPHONE ENCOUNTER
Echocardiogram order faxed to [facility] per patients request. Fax confirmation received and sent to [facility] for scanning. Call placed to patient let her know order was faxed.

## 2025-05-16 NOTE — TELEPHONE ENCOUNTER
SYED    Caller: Cori Gary    Topic/issue: Patient would like order for echocardiogram to be sent to [facility]. Please call patient when order has been sent     Callback Number: [phone]    Thank you,   Jacqui STEELE